# Patient Record
Sex: MALE | Race: BLACK OR AFRICAN AMERICAN | NOT HISPANIC OR LATINO | Employment: STUDENT | URBAN - METROPOLITAN AREA
[De-identification: names, ages, dates, MRNs, and addresses within clinical notes are randomized per-mention and may not be internally consistent; named-entity substitution may affect disease eponyms.]

---

## 2019-02-04 ENCOUNTER — TELEPHONE (OUTPATIENT)
Dept: INTERNAL MEDICINE | Facility: CLINIC | Age: 24
End: 2019-02-04

## 2019-02-04 NOTE — TELEPHONE ENCOUNTER
Called and left message that Dr. Ellison said that she can not sign his immunization form as he was last seen in 2015, he would need to have an updated office visit.

## 2019-02-05 ENCOUNTER — TELEPHONE (OUTPATIENT)
Dept: INTERNAL MEDICINE | Facility: CLINIC | Age: 24
End: 2019-02-05

## 2019-02-05 NOTE — TELEPHONE ENCOUNTER
----- Message from Gregg Mack LPN sent at 2/5/2019  1:05 PM CST -----  Contact: Pt       ----- Message -----  From: Arnaldo Mcmillan  Sent: 2/5/2019  12:57 PM  To: Lashon COLIN Staff    States he's rtn nurses call and can be reached at [phone]//thanks/dbw

## 2019-02-07 ENCOUNTER — TELEPHONE (OUTPATIENT)
Dept: INTERNAL MEDICINE | Facility: CLINIC | Age: 24
End: 2019-02-07

## 2019-02-07 NOTE — TELEPHONE ENCOUNTER
Left message on voice mail explaining that  can't complete and sign form. We can fax a copy of an unsigned immunization record. Otherwise, he will need to be seen. His last visit was in 2015.

## 2019-02-07 NOTE — TELEPHONE ENCOUNTER
Notified pt that we can fax an unsigned copy of immunization record. We can't sign form because he hasn't been seen since 2015. He verbalized understanding. Form faxed to [phone]

## 2019-02-07 NOTE — TELEPHONE ENCOUNTER
----- Message from Denis Mcgraw sent at 2/7/2019 11:45 AM CST -----  Contact: pt  He's calling in regards to a copy of his immunization records, pls e-mail this information to [email or web address], pt is in [place] and enlisting into the ,  pls call pt when this information has been sent to his e-mail, [phone] ([email or web address])

## 2019-02-07 NOTE — TELEPHONE ENCOUNTER
----- Message from Roseann Pereira sent at 2/7/2019 12:03 PM CST -----  Contact: self [phone]  States that he is returning call. Please call back at [phone]//thank you acc

## 2019-07-30 ENCOUNTER — OFFICE VISIT (OUTPATIENT)
Dept: INTERNAL MEDICINE | Facility: CLINIC | Age: 24
End: 2019-07-30
Payer: OTHER GOVERNMENT

## 2019-07-30 VITALS
DIASTOLIC BLOOD PRESSURE: 76 MMHG | OXYGEN SATURATION: 99 % | SYSTOLIC BLOOD PRESSURE: 114 MMHG | WEIGHT: 175.94 LBS | HEIGHT: 68 IN | TEMPERATURE: 97 F | BODY MASS INDEX: 26.66 KG/M2 | HEART RATE: 70 BPM

## 2019-07-30 DIAGNOSIS — S76.011A STRAIN OF GLUTEUS MEDIUS OF RIGHT LOWER EXTREMITY, INITIAL ENCOUNTER: Primary | ICD-10-CM

## 2019-07-30 PROCEDURE — 99213 OFFICE O/P EST LOW 20 MIN: CPT | Mod: PBBFAC | Performed by: FAMILY MEDICINE

## 2019-07-30 PROCEDURE — 99203 OFFICE O/P NEW LOW 30 MIN: CPT | Mod: S$PBB,,, | Performed by: FAMILY MEDICINE

## 2019-07-30 PROCEDURE — 99999 PR PBB SHADOW E&M-EST. PATIENT-LVL III: CPT | Mod: PBBFAC,,, | Performed by: FAMILY MEDICINE

## 2019-07-30 PROCEDURE — 99203 PR OFFICE/OUTPT VISIT, NEW, LEVL III, 30-44 MIN: ICD-10-PCS | Mod: S$PBB,,, | Performed by: FAMILY MEDICINE

## 2019-07-30 PROCEDURE — 99999 PR PBB SHADOW E&M-EST. PATIENT-LVL III: ICD-10-PCS | Mod: PBBFAC,,, | Performed by: FAMILY MEDICINE

## 2019-07-30 RX ORDER — NAPROXEN 500 MG/1
500 TABLET ORAL 2 TIMES DAILY WITH MEALS
Qty: 60 TABLET | Refills: 0 | Status: SHIPPED | OUTPATIENT
Start: 2019-07-30 | End: 2019-08-22 | Stop reason: SDUPTHER

## 2019-07-30 NOTE — PATIENT INSTRUCTIONS
Ice 20 30 min 3 times a day    Naprosyn medication twice a day as needed    Stretches as discussed in clinic 3 times a day    Reduce intensity of workouts as discussed    Recheck 3 weeks

## 2019-07-30 NOTE — PROGRESS NOTES
Pascual Valenzuela  07/30/2019  [id number]    Christiano Ellison MD  Patient Care Team:  Christiano Ellison MD as PCP - General (Family Medicine)  Lacie Diaz LPN as Care Coordinator (Internal Medicine)        Chief Complaint:  Chief Complaint   Patient presents with    Annual Exam     he also pulled a muscle in his glutes two weeks ago       History of Present Illness:  This 24-year-old male states that he was running hills for conditioning several weeks ago when he started having right posterior buttock pain worsened by exercise.  He states that he is now running on more flat surfaces and has less pain but still after workout involving the lower body experiences tightness and pain. He runs on his own during the week and has 3 days of physical training every week with the [employer].  He has discontinued doing any strength training with the lower body because of this injury.    No previous problems or injuries with the right upper leg.  No redness fever night sweats.        History:  History reviewed. No pertinent past medical history.  Past Surgical History:   Procedure Laterality Date    MOUTH SURGERY       Family History   Problem Relation Age of Onset    Hypertension Mother     Diabetes Unknown         Pat side    Cataracts Maternal Uncle     Heart disease Neg Hx     Colon cancer Neg Hx     Prostate cancer Neg Hx      Social History     Socioeconomic History    Marital status: Single     Spouse name: Not on file    Number of children: Not on file    Years of education: Not on file    Highest education level: Not on file   Occupational History    Occupation: student     Comment: [employer]   Social Needs    Financial resource strain: Not on file    Food insecurity:     Worry: Not on file     Inability: Not on file    Transportation needs:     Medical: Not on file     Non-medical: Not on file   Tobacco Use    Smoking status: Never Smoker    Smokeless tobacco: Never Used   Substance  and Sexual Activity    Alcohol use: Yes    Drug use: No    Sexual activity: Yes     Partners: Female     Birth control/protection: Condom   Lifestyle    Physical activity:     Days per week: Not on file     Minutes per session: Not on file    Stress: Not on file   Relationships    Social connections:     Talks on phone: Not on file     Gets together: Not on file     Attends [employer] service: Not on file     Active member of club or organization: Not on file     Attends meetings of clubs or organizations: Not on file     Relationship status: Not on file   Other Topics Concern    Not on file   Social History Narrative    Not on file     Patient Active Problem List   Diagnosis    Chalasia - Left Eye    Strain of gluteus medius of right lower extremity     Review of patient's allergies indicates:  No Known Allergies    The following were reviewed at this visit: active problem list, medication list, allergies, family history, social history, and health maintenance.    Medications:  Current Outpatient Medications on File Prior to Visit   Medication Sig Dispense Refill    acetaminophen (TYLENOL 8 HOUR ORAL) Take by mouth.       No current facility-administered medications on file prior to visit.        Medications have been reviewed and reconciled with patient at this visit.      Exam:  Wt Readings from Last 3 Encounters:   07/30/19 79.8 kg (175 lb 14.8 oz)   06/01/15 73.3 kg (161 lb 11.2 oz)   08/27/14 73.5 kg (162 lb) (61 %, Z= 0.29)*     * Growth percentiles are based on CDC (Boys, 2-20 Years) data.     Temp Readings from Last 3 Encounters:   07/30/19 96.5 °F (35.8 °C) (Tympanic)   06/01/15 96.5 °F (35.8 °C) (Tympanic)   08/27/14 98 °F (36.7 °C) (Tympanic)     BP Readings from Last 3 Encounters:   07/30/19 114/76   06/01/15 120/70   08/27/14 120/62     Pulse Readings from Last 3 Encounters:   07/30/19 70   01/20/14 64     Body mass index is 26.75 kg/m².      Review of Systems   Constitutional: Negative for  chills, fever and weight loss.   Respiratory: Negative for shortness of breath.    Cardiovascular: Negative for chest pain and palpitations.   Musculoskeletal: Positive for myalgias.     Physical Exam alert and oriented well-nourished well-developed fit appearing adult male ambulatory and in no acute distress    Right lower extremity-no deformity swelling or discoloration noted    Patient has point tenderness to the upper proximal gluteus pollo region and extending to the midportion of the buttock.    Patient has full range of motion of the hip joint, good muscle tone, no atrophy.    Strength is good and neurovascular intact    The pain has increased discomfort in the right buttock area with figure 4 stretch, piriformis stretching and cross the body stretching of the right lower leg.    Right knee pain calf appear normal without any swelling or discoloration.    30 min spent face-to-face with patient over half that time spent counseling regarding proper management of this injury and demonstrating several stretches for the patient to do on his own since he would like to avoid formal physical therapy at this time.    We also discussed in detail decreasing the intensity of his runs and workouts until the injury is healed.        Pascual was seen today for annual exam.    Diagnoses and all orders for this visit:    Strain of gluteus medius of right lower extremity, initial encounter    Other orders  -     naproxen (NAPROSYN) 500 MG tablet; Take 1 tablet (500 mg total) by mouth 2 (two) times daily with meals.                After visit summary was printed and given to patient upon discharge today.  Patient goals and care plan are included in After Visit Summary.

## 2019-08-22 ENCOUNTER — HOSPITAL ENCOUNTER (OUTPATIENT)
Dept: RADIOLOGY | Facility: HOSPITAL | Age: 24
Discharge: HOME | End: 2019-08-22
Attending: FAMILY MEDICINE
Payer: OTHER GOVERNMENT

## 2019-08-22 ENCOUNTER — OFFICE VISIT (OUTPATIENT)
Dept: INTERNAL MEDICINE | Facility: CLINIC | Age: 24
End: 2019-08-22
Payer: OTHER GOVERNMENT

## 2019-08-22 VITALS
TEMPERATURE: 98 F | OXYGEN SATURATION: 98 % | SYSTOLIC BLOOD PRESSURE: 118 MMHG | WEIGHT: 175.5 LBS | HEART RATE: 68 BPM | DIASTOLIC BLOOD PRESSURE: 60 MMHG | HEIGHT: 68 IN | BODY MASS INDEX: 26.6 KG/M2

## 2019-08-22 DIAGNOSIS — M54.50 LOW BACK PAIN, NON-SPECIFIC: ICD-10-CM

## 2019-08-22 DIAGNOSIS — M25.552 LEFT HIP PAIN: ICD-10-CM

## 2019-08-22 DIAGNOSIS — S76.011A STRAIN OF GLUTEUS MEDIUS OF RIGHT LOWER EXTREMITY, INITIAL ENCOUNTER: ICD-10-CM

## 2019-08-22 DIAGNOSIS — S76.011A STRAIN OF GLUTEUS MEDIUS OF RIGHT LOWER EXTREMITY, INITIAL ENCOUNTER: Primary | ICD-10-CM

## 2019-08-22 PROCEDURE — 72110 X-RAY EXAM L-2 SPINE 4/>VWS: CPT | Mod: 26,,, | Performed by: RADIOLOGY

## 2019-08-22 PROCEDURE — 99214 PR OFFICE/OUTPT VISIT, EST, LEVL IV, 30-39 MIN: ICD-10-PCS | Mod: S$PBB,25,, | Performed by: FAMILY MEDICINE

## 2019-08-22 PROCEDURE — 99999 PR PBB SHADOW E&M-EST. PATIENT-LVL IV: CPT | Mod: PBBFAC,,, | Performed by: FAMILY MEDICINE

## 2019-08-22 PROCEDURE — 73521 X-RAY EXAM HIPS BI 2 VIEWS: CPT | Mod: 26,,, | Performed by: RADIOLOGY

## 2019-08-22 PROCEDURE — 20552 NJX 1/MLT TRIGGER POINT 1/2: CPT | Mod: S$PBB,,, | Performed by: FAMILY MEDICINE

## 2019-08-22 PROCEDURE — 99999 PR PBB SHADOW E&M-EST. PATIENT-LVL IV: ICD-10-PCS | Mod: PBBFAC,,, | Performed by: FAMILY MEDICINE

## 2019-08-22 PROCEDURE — 73521 XR HIPS BILATERAL 2 VIEW INCL AP PELVIS: ICD-10-PCS | Mod: 26,,, | Performed by: RADIOLOGY

## 2019-08-22 PROCEDURE — 99214 OFFICE O/P EST MOD 30 MIN: CPT | Mod: PBBFAC,25 | Performed by: FAMILY MEDICINE

## 2019-08-22 PROCEDURE — 99214 OFFICE O/P EST MOD 30 MIN: CPT | Mod: S$PBB,25,, | Performed by: FAMILY MEDICINE

## 2019-08-22 PROCEDURE — 20552 PR INJECT TRIGGER POINT, 1 OR 2: ICD-10-PCS | Mod: S$PBB,,, | Performed by: FAMILY MEDICINE

## 2019-08-22 PROCEDURE — 20552 NJX 1/MLT TRIGGER POINT 1/2: CPT | Mod: PBBFAC | Performed by: FAMILY MEDICINE

## 2019-08-22 PROCEDURE — 72110 X-RAY EXAM L-2 SPINE 4/>VWS: CPT | Mod: TC

## 2019-08-22 PROCEDURE — 72110 XR LUMBAR SPINE COMPLETE 5 VIEW: ICD-10-PCS | Mod: 26,,, | Performed by: RADIOLOGY

## 2019-08-22 PROCEDURE — 73521 X-RAY EXAM HIPS BI 2 VIEWS: CPT | Mod: TC

## 2019-08-22 RX ORDER — NAPROXEN 500 MG/1
500 TABLET ORAL 2 TIMES DAILY WITH MEALS
Qty: 60 TABLET | Refills: 0 | Status: SHIPPED | OUTPATIENT
Start: 2019-08-22

## 2019-08-22 NOTE — PATIENT INSTRUCTIONS
Ice for 30 min at a time to low back or thigh muscles as needed    Stretching as discussed    To primary care or sports orthopedic specialist in [facility]    Avoid dead lifts or heavy squats or other aggravating exercises for now

## 2019-08-22 NOTE — PROGRESS NOTES
Pascual Valenzuela  08/22/2019  [id number]    Christiano Ellison MD  Patient Care Team:  Christiano Ellison MD as PCP - General (Family Medicine)  Lacie Diaz LPN as Care Coordinator (Internal Medicine)        Chief Complaint:  Chief Complaint   Patient presents with    3 week follow up       History of Present Illness:   Pascual Valenzuela 24 y.o. male presents today for follow-up of right hip and gluteal strain secondary to intensive physical training 1-2 months ago.  The patient states that his injury is doing better however he has noticed some pain and discomfort in the low back and right hip region which limits his ability to exercise..  No radiation of pain and no actual weakness noted except mild weakness as related to pain with certain movements.  No change in bowel or bladder habits.    He has been a competitive  in the past and also has had intensive training with the [employer] [facility].  He leaves in a few days for college in [facility].    He thinks the Naprosyn medication is helping and would like to have a refill before he goes out of town.      History:  History reviewed. No pertinent past medical history.  Past Surgical History:   Procedure Laterality Date    MOUTH SURGERY       Family History   Problem Relation Age of Onset    Hypertension Mother     Diabetes Unknown         Pat side    Cataracts Maternal Uncle     Heart disease Neg Hx     Colon cancer Neg Hx     Prostate cancer Neg Hx      Social History     Socioeconomic History    Marital status: Single     Spouse name: Not on file    Number of children: Not on file    Years of education: Not on file    Highest education level: Not on file   Occupational History    Occupation: student     Comment: [employer]   Social Needs    Financial resource strain: Not on file    Food insecurity:     Worry: Not on file     Inability: Not on file    Transportation needs:     Medical: Not on file     Non-medical: Not  on file   Tobacco Use    Smoking status: Never Smoker    Smokeless tobacco: Never Used   Substance and Sexual Activity    Alcohol use: Yes    Drug use: No    Sexual activity: Yes     Partners: Female     Birth control/protection: Condom   Lifestyle    Physical activity:     Days per week: Not on file     Minutes per session: Not on file    Stress: Not on file   Relationships    Social connections:     Talks on phone: Not on file     Gets together: Not on file     Attends [employer] service: Not on file     Active member of club or organization: Not on file     Attends meetings of clubs or organizations: Not on file     Relationship status: Not on file   Other Topics Concern    Not on file   Social History Narrative    Not on file     Patient Active Problem List   Diagnosis    Chalasia - Left Eye    Strain of gluteus medius of right lower extremity    Low back pain, non-specific    Left hip pain     Review of patient's allergies indicates:  No Known Allergies    The following were reviewed at this visit: active problem list, medication list, allergies, family history, social history, and health maintenance.    Medications:  Current Outpatient Medications on File Prior to Visit   Medication Sig Dispense Refill    acetaminophen (TYLENOL 8 HOUR ORAL) Take by mouth.      [DISCONTINUED] naproxen (NAPROSYN) 500 MG tablet Take 1 tablet (500 mg total) by mouth 2 (two) times daily with meals. 60 tablet 0     No current facility-administered medications on file prior to visit.        Medications have been reviewed and reconciled with patient at this visit.      Exam:  Wt Readings from Last 3 Encounters:   08/22/19 79.6 kg (175 lb 7.8 oz)   07/30/19 79.8 kg (175 lb 14.8 oz)   06/01/15 73.3 kg (161 lb 11.2 oz)     Temp Readings from Last 3 Encounters:   08/22/19 98.3 °F (36.8 °C) (Tympanic)   07/30/19 96.5 °F (35.8 °C) (Tympanic)   06/01/15 96.5 °F (35.8 °C) (Tympanic)     BP Readings from Last 3 Encounters:    08/22/19 118/60   07/30/19 114/76   06/01/15 120/70     Pulse Readings from Last 3 Encounters:   08/22/19 68   07/30/19 70   01/20/14 64     Body mass index is 26.68 kg/m².      Review of Systems   Constitutional: Negative for chills, fever and weight loss.   Respiratory: Negative for shortness of breath.    Cardiovascular: Negative for chest pain and palpitations.   Musculoskeletal: Positive for joint pain and myalgias.     Physical Exam  WNWD, A&O, very fit appearing young adult male ambulatory and in no acute distress    Lumbar-tender to palpation right lower paralumbar region at L5-S1 with spasm of musculature noted, no deformity or step-off noted    Range of motion within normal limits    Strength is good    Deep tender reflexes 1+ bilaterally throughout including ankle jerk and knee jerk    Negative straight leg raise is, however right hamstring is slightly more tight at 80° flexion than the left hamstring.    Right hip-no deformity or discoloration    Internal external range of motion normal    Strength normal    Neurovascular intact    Mild diffuse tenderness to palpation of the lateral and posterior hip regions.  Some tenderness at the right SI joint.    ITB band nontender to palpation but is somewhat tight on Mansi test.    Also patient has some element of piriformis and right gluteal tightness some discomfort with figure 4 stretching.      X-rays do not reveal any acute injury and no evidence of pars fractures.  Verbal consent obtained for procedure-patient understands there can be discoloration of the skin, soreness for few days after the injection, but there is a good chance the injection may reduce his pain and help resolve the pain and spasm more quickly.      Procedure note-Celestone 1 cc, lidocaine 1 cc an bupivacaine 1 cc injected with 23 gauge needle IM and to trigger point and point of maximal tenderness right lower lumbar region approximately L5-S1 right paralumbar without complication and  patient tolerated the procedure well.    Patient voices understanding of the plan for stretching and care of his low back and hip strains and voices appreciation of his care today including the injection.  I discussed the nature of the problem(s) with the patient today.    A recommended the patient follow up in [facility] in several weeks for his lumbar and hip pain particularly since he needs to be able to continue intense training as soon as possible and I recommended [facility] as a good place for him to find top sports medicine physician.  The patient was encouraged to participate in appropriate physical activity.    After visit summary was printed and given to patient upon discharge today.  Patient goals and care plan are included in After Visit Summary.